# Patient Record
Sex: FEMALE | Race: AMERICAN INDIAN OR ALASKA NATIVE
[De-identification: names, ages, dates, MRNs, and addresses within clinical notes are randomized per-mention and may not be internally consistent; named-entity substitution may affect disease eponyms.]

---

## 2017-05-10 ENCOUNTER — HOSPITAL ENCOUNTER (EMERGENCY)
Dept: HOSPITAL 31 - C.ER | Age: 52
Discharge: HOME | End: 2017-05-10
Payer: COMMERCIAL

## 2017-05-10 VITALS
DIASTOLIC BLOOD PRESSURE: 90 MMHG | OXYGEN SATURATION: 99 % | TEMPERATURE: 97.6 F | HEART RATE: 75 BPM | SYSTOLIC BLOOD PRESSURE: 164 MMHG

## 2017-05-10 VITALS — RESPIRATION RATE: 20 BRPM

## 2017-05-10 DIAGNOSIS — M54.16: Primary | ICD-10-CM

## 2017-05-10 LAB
BILIRUB UR-MCNC: NEGATIVE MG/DL
GLUCOSE UR STRIP-MCNC: NORMAL MG/DL
KETONES UR STRIP-MCNC: NEGATIVE MG/DL
LEUKOCYTE ESTERASE UR-ACNC: (no result) LEU/UL
PH UR STRIP: 5 [PH] (ref 5–8)
PROT UR STRIP-MCNC: (no result) MG/DL
RBC # UR STRIP: NEGATIVE /UL
RBC #/AREA URNS HPF: < 1 /HPF (ref 0–3)
SP GR UR STRIP: 1.02 (ref 1–1.03)
UROBILINOGEN UR-MCNC: NORMAL MG/DL (ref 0.2–1)
WBC #/AREA URNS HPF: 1 /HPF (ref 0–5)

## 2017-05-10 NOTE — C.PDOC
History Of Present Illness


51 yr old female presents to the ER for evaluation of right sided back pain, 

gradually worsening since morning. Patient states the pain is localized and 

worse with movement and standing. Patient admits to the similar symptoms in the 

past. Patient denies trauma, injury, nausea, vomiting, abdominal pain, diarrhea

, dysuria, hematuria, incontinence, saddle anesthesia, weakness or numbness to B

/L LEs. Ambulate to Ed for evaluation, appears in pain. 


Time Seen by Provider: 05/10/17 21:17


Chief Complaint (Nursing): Back Pain


History Per: Patient


History/Exam Limitations: no limitations


Onset/Duration Of Symptoms: Gradual (Since morning )


Current Symptoms Are (Timing): Still Present





Past Medical History


Reviewed: Historical Data, Nursing Documentation, Vital Signs


Vital Signs: 


 Last Vital Signs











Temp  97.6 F   05/10/17 21:16


 


Pulse  75   05/10/17 21:16


 


Resp  18   05/10/17 21:16


 


BP  164/90 H  05/10/17 21:16


 


Pulse Ox  99   05/10/17 21:46














- Medical History


PMH: HTN


Family History: States: No Known Family Hx





- Social History


Hx Alcohol Use: No


Hx Substance Use: No





Review Of Systems


Except As Marked, All Systems Reviewed And Found Negative.


Gastrointestinal: Negative for: Nausea, Vomiting, Abdominal Pain, Diarrhea


Genitourinary: Negative for: Dysuria, Incontinence, Hematuria


Musculoskeletal: Positive for: Back Pain (Right sided back pain )


Neurological: Negative for: Weakness, Numbness





Physical Exam





- Physical Exam


Appears: Well, Non-toxic, No Acute Distress


Skin: Normal Color, Warm


Eye(s): bilateral: PERRL


Nose: Normal


Throat: Normal, No Erythema, No Exudate, No Drooling


Neck: Trachea Midline, Supple


Cardiovascular: Rhythm Regular


Respiratory: No Stridor, No Wheezing


Gastrointestinal/Abdominal: Soft, No Tenderness


Back: No CVA Tenderness, No Vertebral Tenderness, Paraspinal Tenderness (RIGHT 

LUMBAR PARASPINAL TENDERNESS. NO MIDLINE TENDERNESS), Other (RIGHT FLANK 

TENDERNESS)


Extremity: No Pedal Edema, No Swelling


Neurological/Psych: Oriented x3, Normal Speech, Normal Motor, Normal Sensation, 

Normal Reflexes





ED Course And Treatment


O2 Sat by Pulse Oximetry: 99


Pulse Ox Interpretation: Normal


Progress Note: On re-eval, pt is afebrile, hemodynamicalyt stable. Nontoxic. 

AMbulaoty irn ED with stable gait. PulseOx 98% RA.  ENT: no acute findings.  ABd

: benign. Neurologicaly intact. UA results review and appears normal. Pt has 

clinical findings c/w Right sided lumbar radiculopathy. Pt advised. Ref. to f/u 

with PMD in 2-3 days for re-eval.  return if any new changes.





Medical Decision Making


Medical Decision Making: 


PLAN:


* Urinalysis 


* Percocet PO 


* Motrin PO 








Disposition


Counseled Patient/Family Regarding: Studies Performed, Diagnosis, Need For 

Followup, Rx Given





- Disposition


Referrals: 


Germán Cannon MD [Staff Provider] - 


Disposition: HOME/ ROUTINE


Disposition Time: 21:49


Condition: STABLE


Additional Instructions: 


Take medication as prescribed





Light duty to lower back





Follow up with PMD in 2-3 days for re-evaluation.


Return to ED if any worsening or new changes.


Prescriptions: 


Ibuprofen [Motrin Tab] 600 mg PO Q6 #20 tab


Methocarbamol [Robaxin] 500 mg PO TID #14 tab


traMADol [Ultram] 50 mg PO TID #7 tab


Instructions:  Lumbar Radiculopathy (ED)





- Clinical Impression


Clinical Impression: 


 Lumbar radiculopathy








- PA / NP / Resident Statement


MD/DO has reviewed & agrees with the documentation as recorded.





- Scribe Statement


The provider has reviewed the documentation as recorded by the Scribe


Lana Rodriguez





All medical record entries made by the Scribe were at my direction and 

personally dictated by me. I have reviewed the chart and agree that the record 

accurately reflects my personal performance of the history, physical exam, 

medical decision making, and the department course for this patient. I have 

also personally directed, reviewed, and agree with the discharge instructions 

and disposition.

## 2018-03-27 ENCOUNTER — HOSPITAL ENCOUNTER (EMERGENCY)
Dept: HOSPITAL 14 - H.ER | Age: 53
Discharge: HOME | End: 2018-03-27
Payer: COMMERCIAL

## 2018-03-27 VITALS
HEART RATE: 100 BPM | OXYGEN SATURATION: 99 % | DIASTOLIC BLOOD PRESSURE: 84 MMHG | RESPIRATION RATE: 18 BRPM | TEMPERATURE: 98.8 F | SYSTOLIC BLOOD PRESSURE: 150 MMHG

## 2018-03-27 DIAGNOSIS — J02.9: Primary | ICD-10-CM

## 2018-03-27 DIAGNOSIS — I10: ICD-10-CM

## 2018-03-27 NOTE — ED PDOC
HPI: General Adult


Time Seen by Provider: 03/27/18 16:36


Chief Complaint (Nursing): ENT Problem


History Per: Patient


Additional Complaint(s): 





Pt. states she's had sore throat since Sunday morning with mild intermittent 

cough that began today. Denies abdominal pain, N/V/D, hemoptysis, chest pain, 

SOB, rash. 





Past Medical History


Reviewed: Historical Data, Nursing Documentation, Vital Signs


Vital Signs: 


 Last Vital Signs











Temp  98.8 F   03/27/18 16:02


 


Pulse  100 H  03/27/18 16:02


 


Resp  18   03/27/18 16:02


 


BP  150/84   03/27/18 16:02


 


Pulse Ox  99   03/27/18 17:11














- Medical History


PMH: HTN





- Family History


Family History: States: Unknown Family Hx





- Home Medications


Home Medications: 


 Ambulatory Orders











 Medication  Instructions  Recorded


 


Losartan [Cozaar] mg PO DAILY 12/19/16


 


Ofloxacin Otic 0.3% [Floxin 0.3% 10 drop AD DAILY #1 bottle 12/19/16





Otic Soln]  


 


Ibuprofen [Motrin Tab] 600 mg PO Q6 #20 tab 05/10/17


 


Methocarbamol [Robaxin] 500 mg PO TID #14 tab 05/10/17


 


traMADol [Ultram] 50 mg PO TID #7 tab 05/10/17


 


Naproxen [Naprosyn] 500 mg PO BID PRN #10 tab 03/27/18














- Allergies


Allergies/Adverse Reactions: 


 Allergies











Allergy/AdvReac Type Severity Reaction Status Date / Time


 


lisinopril Allergy  ANGIOEDEMA Verified 12/19/16 18:34














Review of Systems


ROS Statement: Except As Marked, All Systems Reviewed And Found Negative


ENT: Positive for: Throat Pain


Respiratory: Positive for: Cough





Physical Exam





- Physical Exam


Appears: Positive for: Well, Non-toxic, No Acute Distress


Skin: Positive for: Normal Color, Warm.  Negative for: Rash


Eye Exam: Positive for: Normal appearance


ENT: Positive for: TM Is/Are (non-erythematous, non-bulging b/l), Pharyngeal 

Erythema.  Negative for: Tonsillar Exudate, Tonsillar Swelling


Cardiovascular/Chest: Positive for: Regular Rate, Rhythm.  Negative for: 

Tachycardia


Respiratory: Positive for: Normal Breath Sounds.  Negative for: Crackles, Rales

, Rhonchi, Stridor, Respiratory Distress


Gastrointestinal/Abdominal: Positive for: Normal Exam, Bowel Sounds, Soft.  

Negative for: Tenderness


Neurologic/Psych: Positive for: Alert, Oriented





- ECG


O2 Sat by Pulse Oximetry: 99





- Progress


ED Course And Treament: 





Rapid strep, naproxen 500mg PO ordered 





1853


Rapid strep: negative.


On re-evaluation, pt. reports feeling much better. 





Disposition





- Clinical Impression


Clinical Impression: 


 Viral pharyngitis








- Patient ED Disposition


Is Patient to be Admitted: No





- Disposition


Referrals: 


[facility] [Outside]


Disposition: Routine/Home


Disposition Time: 18:54


Condition: STABLE


Prescriptions: 


Naproxen [Naprosyn] 500 mg PO BID PRN #10 tab


 PRN Reason: Pain or fever


Instructions:  Viral Pharyngitis


Forms:  [employer] (English)


Print Language: ENGLISH

## 2018-05-03 ENCOUNTER — HOSPITAL ENCOUNTER (EMERGENCY)
Dept: HOSPITAL 14 - H.ER | Age: 53
Discharge: HOME | End: 2018-05-03
Payer: COMMERCIAL

## 2018-05-03 VITALS
HEART RATE: 74 BPM | TEMPERATURE: 98.5 F | SYSTOLIC BLOOD PRESSURE: 154 MMHG | OXYGEN SATURATION: 100 % | RESPIRATION RATE: 17 BRPM | DIASTOLIC BLOOD PRESSURE: 82 MMHG

## 2018-05-03 VITALS — BODY MASS INDEX: 41.5 KG/M2

## 2018-05-03 DIAGNOSIS — M17.12: Primary | ICD-10-CM

## 2018-05-03 DIAGNOSIS — I10: ICD-10-CM

## 2018-05-03 NOTE — RAD
PROCEDURE:  Left Knee Radiographs.



HISTORY:

Pain.



COMPARISON:

None.



FINDINGS:



BONES:

No evidence of acute displaced fracture nor dislocation. The osseous 

structures appear intact.  



JOINTS:

Mild degenerative osteoarthritis most notably affecting the medial 

and patellofemoral compartments with mild medial joint space 

narrowing and slight spurring of the medial tibial spine. There are 

also small posterior patellar osteophytes with small to medium-sized 

anterior superior patella enthesophyte. 



JOINT EFFUSION:

Suspect trace suprapatellar joint effusion 



OTHER FINDINGS:

None.



IMPRESSION:

No acute fractures.  Mild DJD

## 2018-08-03 ENCOUNTER — HOSPITAL ENCOUNTER (OUTPATIENT)
Dept: HOSPITAL 31 - C.SDS | Age: 53
Discharge: HOME | End: 2018-08-03
Attending: STUDENT IN AN ORGANIZED HEALTH CARE EDUCATION/TRAINING PROGRAM
Payer: COMMERCIAL

## 2018-08-03 VITALS
TEMPERATURE: 97 F | HEART RATE: 88 BPM | DIASTOLIC BLOOD PRESSURE: 70 MMHG | RESPIRATION RATE: 18 BRPM | SYSTOLIC BLOOD PRESSURE: 147 MMHG

## 2018-08-03 VITALS — OXYGEN SATURATION: 100 %

## 2018-08-03 VITALS — BODY MASS INDEX: 41.1 KG/M2

## 2018-08-03 DIAGNOSIS — S83.232D: ICD-10-CM

## 2018-08-03 DIAGNOSIS — S83.272D: ICD-10-CM

## 2018-08-03 DIAGNOSIS — M93.262: Primary | ICD-10-CM

## 2018-08-03 DIAGNOSIS — M67.862: ICD-10-CM

## 2018-08-03 LAB
HEPATITIS A IGM: NEGATIVE
HEPATITIS B CORE AB: NEGATIVE
HEPATITIS C ANTIBODY: NEGATIVE

## 2018-08-03 PROCEDURE — 29880 ARTHRS KNE SRG MNISECTMY M&L: CPT

## 2018-08-03 PROCEDURE — 80074 ACUTE HEPATITIS PANEL: CPT

## 2018-08-03 PROCEDURE — 29876 ARTHRS KNEE SURG SYNVCT MAJ: CPT

## 2018-08-03 PROCEDURE — 86703 HIV-1/HIV-2 1 RESULT ANTBDY: CPT

## 2018-08-03 PROCEDURE — 86592 SYPHILIS TEST NON-TREP QUAL: CPT

## 2018-08-03 PROCEDURE — 36415 COLL VENOUS BLD VENIPUNCTURE: CPT

## 2018-08-03 PROCEDURE — 86706 HEP B SURFACE ANTIBODY: CPT

## 2018-08-03 RX ADMIN — HYDROMORPHONE HYDROCHLORIDE PRN MG: 1 INJECTION, SOLUTION INTRAMUSCULAR; INTRAVENOUS; SUBCUTANEOUS at 13:38

## 2018-08-03 RX ADMIN — HYDROMORPHONE HYDROCHLORIDE PRN MG: 1 INJECTION, SOLUTION INTRAMUSCULAR; INTRAVENOUS; SUBCUTANEOUS at 13:18

## 2018-08-03 NOTE — PCM.ANESB7
Adductor Canal Block





- Adductor Canal Block


Date of Procedure: 08/03/18


Anesthiologist: Greg Putnam


Pre-Procedure Diagnosis: acute postoperative pain


Post-Procedure Diagnosis: acute postoperative pain


Procedure Performed: Adductor Canal Block Left





- Procedure


Adductor Canal Block: 


The procedure was explained to the patient that it is for the post-operative 

pain management. Consent was obtained after a thorough discussion with the 

patient regarding the benefits and possible complications of local anesthetic 

adductor canal block of the femoral nerve. Standard monitors, as defined by the 

ASA, were applied to the patient. Time-out was held with the circulating nurse 

to confirm the appropriate block. After applying supplemental oxygen and 

administering IV Sedation as needed, the patient was placed in supine position 

with and the operative leg was flexed slightly at the knee and externally 

rotated as needed, and was kept anatomically stable. The mid-thigh of the  LEFT 

lower extremity was exposed. The ultrasound transducer was then applied 

transversely along the medial aspect, about midway down the thigh and the 

femoral artery and vein were identified in appropriate relation with the 

sartorius muscle. At this time, the femoral nerve was visualized lateral to the 

femoral artery within the canal. After thorough identification, this area area 

was prepped with Chloroprep solution three times and 1 % Lidocaine was injected 

subcutaneously for topical anesthesia.





At this point, a #22 gauge Stimuplex 4-inch needle was inserted in-plane in a 

lateral-to-medial orientation, and advanced toward the femoral nerve. 

Advancement was performed carefully under direct ultrasound visualization. . 

After negative aspiration, _10_cc of __0.25% bupivicaine__was injected and this 

was followed with 0.25% bupivicaine. Under ultrasound guidance the local 

anesthetics were observed spreading around the saphenous nerve. The needle was 

removed intact and sterile dressing was applied. The patient had stable vital 

signs, was conscious and in no apparent distress.





The patient tolerated the  nerve block well with stable vital signs with no 

complaints throughout, no paraesthesias or pain during injection.

## 2018-08-04 NOTE — PCM.SURG1
Surgeon's Initial Post Op Note





- Surgeon's Notes


Surgeon: Mena Albarado MD


Assistant: TRENTON Bowman


Type of Anesthesia: General Endo, Block Regional


Pre-Operative Diagnosis: Left knee:  #1 medial meniscal tear.  #2 osteochondral 

full thickness injury to Lateral femoral condyle.  #3 lateral patellar 

maltracking.  #4 valgus alignment.  #5 synovitis


Operative Findings: Left knee:  #1 medial meniscal tear (complex posterior horn 

flap tear not repairable/ peripheral red-red zone tear with instability).  #2 

osteochondral full thickness injury to Lateral femoral condyle (measuring 

[id number] at anterior aspect of WB zone).  #3 full thickness cartilage injury 

to medial femoral condyle (measuring 20mm AP x 8mm width at WB aspect of middle 

MFC).  #4 lateral meniscal tear (free edge complex tearing, not repairable/ 

peripheral red-red zone menisco-capsular detachment, repairable).  #5 lateral 

patellar maltracking (grade 2).  #6 valgus alignment.  #7 synovitis all 3 

compartments.  #8 hypertorphic fat pad inflammed (casuing anterior impingement)

.  #9 patello-femoral/ trochlear grade 2 chondromalacia


Post-Operative Diagnosis: Left knee:  #1 medial meniscal tear (complex 

posterior horn flap tear not repairable/ peripheral red-red zone tear with 

instability).  #2 osteochondral full thickness injury to Lateral femoral 

condyle (measuring [id number] at anterior aspect of WB zone).  #3 full thickness 

cartilage injury to medial femoral condyle (measuring 20mm AP x 8mm width at WB 

aspect of middle MFC).  #4 lateral meniscal tear (free edge complex tearing, 

not repairable/ peripheral red-red zone menisco-capsular detachment, repairable)

.  #5 lateral patellar maltracking (grade 2).  #6 valgus alignment.  #7 

synovitis all 3 compartments.  #8 hypertorphic fat pad inflammed (casuing 

anterior impingement).  #9 patello-femoral/ trochlear grade 2 chondromalacia


Operation Performed: Left knee :  #1 open osteochondral allograft transplant 

lateral femoral condyle.  #2 open osteochondral allograft transplant medial 

femoral condyle.  #3 arthroscopic lateral meniscus repair.  #4 arthroscopic 

partial medial meniscectomy.  #5 arthroscopic extensive synovectomy all 3 

compartments.  #6 open extensor mechanism realignment (VMO advancement and MPF 

L plication).  #7 arthroscopic resection and debridement of symptomatic med and 

lateral plica bands.  #8 arthroscopic resection and debridement of hypertrophic

  fat pad.  #9 arthroscopic intra-articular PRP injection


Specimen/Specimens Removed: specimen = none.  Complications = none.  Tourniquet 

time = 120 minutes at 300 mmHg.  Implant =.  #1 Linvatec all inside Sequent 

meniscal repair system with 4 implants used for medial meniscus stabilization, 

8 implants used for lateral meniscus repair, 3 kids opened in total.  #2 [employer]

/Arthrex lateral femoral condyle fresh osteochondral allograft for which 

osteochondral allograft transplantation was carried out to the lateral femoral 

condyle and medial femoral condyle


Estimated Blood Loss: EBL {In ML}: 50


Blood Products Given: N/A


Drains Used: No Drains


Post-Op Condition: Good


Date of Surgery/Procedure: 08/03/18


Time of Surgery/Procedure: 12:00

## 2018-08-05 NOTE — OP
Copied To:  Mena Albarado MD

Attending MD:  Mena Albarado MD



PROCEDURE DATE:  2018



PREOPERATIVE DIAGNOSES:  Left knee,

1.  Medial meniscal tear.

2.  Osteochondral injury, lateral femoral condyle.

3.  Lateral patellar maltracking.

4.  Valgus alignment.

5.  Synovitis.



POSTOPERATIVE DIAGNOSES:   Left knee,

1.  Medial meniscal tear (complex posterior horn flap tear not

repairable/peripheral red-red zone tear with instability amenable to

stabilization).

2.  Osteochondral full-thickness injury to lateral femoral condyle

(measuring 20 mm anterior to posterior x 23 mm width at anterior aspect of

weightbearing zone).

3.  Full-thickness cartilage injury to medial femoral condyle (measuring 20

mm anterior to posterior x 8 mm width at weightbearing aspect of middle

zone of medial femoral condyle).

4.  Lateral meniscal tear (free-edge complex tearing not

repairable/peripheral red-red zone meniscal capsular detachment repairable)

5.  Lateral patellar maltracking grade 2.

6.  Valgus alignment.

7.  Synovitis in all 3 compartments.

8.  Hypertrophic inflamed fat-pad (causing anterior impingement).

9.  Patellofemoral/trochlear grade 2 chondromalacia.



PROCEDURE:  Left knee,

1.  Open osteochondral allograft transplantation to lateral femoral

condyle.

2.  Open osteochondral allograft transplantation to medial femoral condyle.

3.  Arthroscopic lateral meniscus repair.

4.  Arthroscopic partial medial meniscectomy.

5.  Arthroscopic extensive synovectomy, all three compartments.

6.  Open extensor mechanism realignment.

7.  Arthroscopic resection and debridement of symptomatic medial and

lateral plica bands.

8.  Arthroscopic resection and debridement of hypertrophic fat-pad.

9.  Arthroscopic intraarticular PRP injection.

10.  Arthroscopic chondroplasty patellofemoral joint.



SURGEON:  Mena Albarado MD



ASSISTANT:  Liz Tavera PA-C



JUSTIFICATION FOR ASSISTANT:  Liz Tavera is a certified physician

assistant whose skilled surgical service was an absolutely necessity for

successful completion of the procedure as he provided skilled surgical

assistance with positioning of patient, positioning of extremity,

management of surgical field, management of arthroscopic equipment

including all inside arthroscopic meniscal repair, chondroplasty,

retractions of neurovascular structures, and preparation of recipient site

for osteochondral allograft to medial femoral condyle and lateral femoral

condyle, preparation of donor osteochondral allograft transplantation

grafts, allograft transplantation to both sites, final placement and

fixation of osteochondral allograft transplantation to medial femoral

condyle and lateral femoral condyle, open extensor mechanism realignment,

wound closure, fitting and placement of postop hinged knee brace.  Emaus

Liang was present for the entire case and was an absolute necessity for

successful completion of the procedure.



TYPE OF ANESTHESIA:  General endotracheal anesthesia with a postop regional

nerve block placed by anesthesia staff in PACU.



COMPLICATIONS:  None.



ESTIMATED BLOOD LOSS:  50 mL



SPECIMEN:  None.



DRAINS:  None.



IMPLANTS:  L[employer]atec All-Inside Meniscal Repair System with 4 implants used

for medial meniscus stabilization, 8 implants used for lateral meniscus

repair, 4 kits opened in total.  Chartboost/Arthrex lateral femoral condyle

fresh osteochondral allograft for which the osteochondral allograft

transplantation was harvested and used as a donor for the recipient to the

lateral femoral condyle and medial femoral condyle.



DISPOSITION:  Patient was extubated and transferred to the PACU in stable

condition and tolerated the procedure well.



INDICATIONS FOR SURGERY:  The patient is a 53-year-old female with a past

medical history significant for hypertension who presents to the office

under my care for the first time on 2018 with left knee pain with

multiple episodes of giving way and locking.  She stated that she thought 3

weeks previously she had a misstep and had progressively worsening medial

and lateral joint line pain.  Prior to this, she did not have significant

left knee pain.  She had been increasing her activity as well to try to

lose weight, and at this point in time, feels that she is frustrated

because she is not able to continue her graf progress in her lifestyle

modification to lose weight.  She was referred by her primary care

physician, Dr. Cannon, who attempted for an MRI that was done at [facility] on 2018 of the left knee.  MRI left knee

done at [facility] on 2018 was read as:

1.  Mild-to-moderate cartilage loss involving the anterior to mid portion

of the lateral femoral condyle with a large focal area of signal

abnormality with cortical surface abnormality at the level of the anterior

lateral femoral condyle near the intercondylar notch, measuring 2 x 1.5 x

2.2 cm.  This may represent a prominent osteochondral lesion with

associated articular surface and cortical abnormality.

2.  Thinning and attenuation with increased signal seen within the

visualized anterior cruciate ligament, suggested for moderate great sprain.

3.  Transverse linear oblique signal within the body and posterior horn of

the medial meniscus, suggested for a tear.

4.  Linear increased signal seen within the body and posterior horn of the

lateral meniscus, suggested for intrasubstance degeneration.

5.  High-grade sprain and/or partial tearing, medial collateral ligament.

6.  Focal cartilage thinning and loss at the medial patellar facet.

7.  Moderate cartilage thinning and loss involving the anterior to mid

portion of the medial compartment.



On initial presentation in the office, she had painful range of motion with

pain localized to the medial and lateral joint lines with no evidence of

instability.  There was reproducible clicking and catching along the

lateral femoral condyle during patellar tracking representing what we

thought would be an osteochondral lesion interacting with the

patellofemoral joint as she had lateral patellar maltracking and a valgus

alignment.  The patella exhibited grade 1 to 2 lateral patellar maltracking

but no full on instability or ability to dislocate.  X-rays done in my

office showed that the joint spaces were well maintained with no

significant advanced signs of degenerative joint disease present.  She

underwent a cortisone mixture injection in my office on 2018 that

resulted in complete resolution of pain and she was placed in a hinged knee

brace.  She was referred to physical therapy for which she was compliant. 

She returned to the office 4 weeks later and stated that the injection

provided her with near complete resolution of pain for approximately 1 week

and then the pain progressively returned to baseline level of 8/10 at all

times.  She was having difficulty with ADLs and performing her work

functions and getting to work to commute.  After reviewing her MRI with her

and going through treatment options, she stated that she did not want to

undergo anymore injections and that she wanted to have definitive care as

soon as possible, as this pain was acute in onset and she did have acute

changes on MRI.  She was indicated for left knee arthroscopic, medial and

possibly lateral meniscus repairs versus partial meniscotomy, synovectomy,

and all related indicated arthroscopic procedures as well as open

osteochondral allograft transplantation to the lateral femoral condyle and

possibly the medial femoral condyle as well as an open extensor mechanism

realignment in the form of MPFL plication and VMO advancement and all

related indicated procedures.  The risks, benefits, and alternatives of the

procedure were discussed at length with the patient with the risks included

not limited to infection, neurovascular damage, need for further surgery,

failure of graft incorporation, graft rejection, accelerated chondral wear,

postoperative pain, development of chronic pain and disability, development

of blood clots including DVT and PE, need for further surgery, progression

of degenerative joint disease to the point of needing a knee replacement,

inability to return to preinjury level of activity and function, anesthesia

reactions including death.  After answering all of her questions, she

stated that she understood the risks and wished to proceed with surgery. 

She watched surgical animation videos and diagnosis animation video and

stated that she had a good understanding of her procedure as well as her

diagnoses.  I reviewed at length with her the postoperative rehabilitation

protocol and she stated that she had full understanding of the protocol and

knew that she had to be compliant with the postop instructions in order for

her to maximize chances of having successful outcome after surgery.  She

was referred to her primary care physician, Dr. Cannon, for preoperative

medical evaluation and the procedure was scheduled at [facility] on

2018.



PROCEDURE IN DETAIL:  The patient was identified in the preoperative

holding area and the left knee was marked for surgery.  Once again, as

described above, the risks, benefits, and alternatives of the procedure

were discussed at length with the patient and informed consent was

obtained.  After brief discussion with anesthesia staff, the patient was

taken to the operating room and placed on a well padded operating room

table without bony prominences and superficial neurovascular structures

were well padded.  An initial time-out was done with the surgeon,

anesthesia staff, OR staff, all in agreement with the patient, procedure to

be done, extremity to be operated on.  General anesthesia was administered

without difficult or complications.  Then, examination under anesthesia was

then carried.



EXAMINATION UNDER ANESTHESIA:  Left knee with full range of motion compared

to contralateral knee.  There was reproducible clicking and catching at the

lateral aspect of the patellofemoral joint, representing an osteochondral

defect of the lateral femoral condyle anteriorly engaging with the patella

as the patella had lateral patellar maltracking that was evident on exam,

no evidence of instability, negative Lachman, negative anterior drawer,

negative posterior drawer, negative reverse Lachman, negative pivot shift,

negative reverse pivot shift, negative opening to medial and lateral joint

line to 0 to 30 degrees varus or valgus stress, patella with grade 2

lateral maltracking and subluxation with no gross dislocation evident with

a positive J-sign and overall positive valgus alignment.



CONTINUATION OF PROCEDURE:  Tourniquet was placed high on the left thigh

but never inflated.  Left lower extremity was prepped and draped in

standard sterile fashion.  Final time-out was done with the surgeon,

anesthesia staff, OR staff, all in agreement with the patient, procedure to

be done, and extremity to be operated on.  We began with a diagnostic

arthroscopy and arthroscopic surgery/treatment of intraarticular pathology.

The knee was insufflated with 50 mL of normal saline.  Anterolateral portal

was created with stab incision through skin down and subcutaneous tissues

down to the level of capsule.  Blunt arthroscopic trocar and cannula were

inserted into the suprapatellar pouch and insufflation of the arthroscopic

fluid was begun.  Arthroscopic camera was inserted and with the use of

spinal needle localization, anteromedial portal was created through stab

incision through skin down to subcutaneous tissues down to the level of

capsule and the accessory cannula was inserted through the anteromedial

portal.  The knee was then copiously irrigated for better visualization and

removal of synovial debris.  With the use of an arthroscopic probe, a

diagnostic arthroscopy was then carried out.



DIAGNOSTIC ARTHROSCOPY:  We first turned our attention to the suprapatellar

pouch and there was no evidence of adhesions or loose bodies.  Attention

was then turned towards the patellofemoral joint.  What immediately seen

was a laterally subluxed patella with a grade 2 lateral instability and

maltracking with chondromalacia at the medial facet grade 2 with no

focal-thickness defect seen, just fibrillation of cartilage.  We then

turned our attention to the medial gutter, where immediately seen extending

from the inferomedial aspects of the patella to the medial retinaculum was

a thickened hypertrophic medial plica band with significant inflammation. 

There was also significant hypertrophic synovium and synovitis throughout

all 3 compartments.  We then turned out attention to the medial

compartment, where immediately seen was a complex flap tear of the

posterior horn that did not appear to be repairable with some degenerative

changes.  The posterior horn also exhibited a secondary tear in the

periphery with significant instability of the posterior horn with ability

to sublux the posterior horn of the meniscus beyond the midpoint of the

medial femoral condyle.  The medial femoral condyle exhibited a focal area

of 8 mm width x 20 mm anterior to posterior length of full-thickness

cartilage loss at the weightbearing aspect of the medial femoral condyle

with surrounding intact zone of cartilage.  The medial tibial plateau

exhibited one small area measuring 2 mm x 2 mm of full-thickness cartilage

injury and intact cartilage throughout the rest of the plateau.  This

isolated medial tibial plateau zone of injury was just adjacent to the

anterior horn of the medial meniscus at the most anteromedial aspect of the

plateau.  Attention was then turned towards the intercondylar notch where

intact ACL and PCL were seen, and again, there was hypertrophic synovium

throughout all 3 compartments as well as a thickened hypertrophic and

inflamed fat-pad that appeared to be causing anterior impingement

coinciding with the patient's pain at terminal extension.  Attention was

then turned towards the lateral compartment where immediately seen and

visualized at the initial diagnostic scope at the patellofemoral joint was

a zone of osteochondral injury and chondral blistering with underlying

exposed subchondral bone at the anterior aspect of the lateral femoral

condyle at the weightbearing zone at the junction with the trochlea, also

interacting with the patella as it was a lateral maltracking patella.  This

zone of injury measured 20 mm in width x 23 mm in anterior to posterior

length.  Also, within the lateral compartment, under careful evaluation was

a lateral meniscus tear with 2 zones of injury.  There was a free edge

complex tearing at the posterior horn extending to the mid body that was

not amenable to repair.  There was also a red-red zone peripheral injury of

the lateral meniscus with significant instability.



ARTHROSCOPIC LATERAL MENISCUS REPAIR:  With the use of arthroscopic shaver

and radiofrequency ablation, partial lateral meniscectomy was carried out,

resecting less than 5% of lateral meniscus overall, debriding and

establishing a smooth contour at the area of free edge tear as described

above.  This was a complex tear at the free edge of the posterior horn,

extending into the posterior midbody as complex free edge white-white zone

injury.  There was also the secondary zone of injury of the lateral

meniscus at the periphery as a red-red zone injury/meniscal capsular

separation with resulting lateral meniscus instability as a large

peripheral tear.  The quality of the lateral meniscus tissue was good, and

therefore, we decided to proceed with lateral meniscus repair and

stabilization.  With the use of the [employer] All-Inside Meniscal Repair

System, 4 implants were placed superiorly at the superior aspect of lateral

meniscus anterior to the popliteal hiatus with care taken not to

incorporate the popliteal tendon into the repair constructs.  Four implants

were placed with good capsular-sided fixation with resulting 3 vertical

mattress sutures.  This provided significant stability and restoration of

the meniscal capsular relationship of the posterior horn of the lateral

meniscus.  This was repeated with placement of 4 more implants on the

inferior aspect of the posterior horn posterior to the popliteal hiatus

restoring good stability, and meniscal capsular separation was repaired

successfully with good stability achieved.  Again, this was a peripheral

red-red zone injury and the tissue was of good quality and amenable to

repair and stabilization.



ARTHROSCOPIC CHONDROPLASTY:  With the use of arthroscopic shaver and

radiofrequency ablation, chondroplasty of the patellofemoral joint and the

medial tibial plateau chondral injury was carried out.  Smooth contouring

to the patella and trochlea were established with use of the radiofrequency

ablation and the shaver.  The small zone of focal cartilage injury to the

anteromedial aspect of the tibial plateau was debrided with use of

arthroscopic shaver and radiofrequency ablation establishing a smooth

contour as well.  Once the chondroplasty was completed at the

patellofemoral joint and lateral compartment, we then turned our attention

to medial compartment.



ARTHROSCOPIC PARTIAL MEDIAL MENISCECTOMY:  We turned our attention to the

medial compartment and reevaluated the medial meniscus.  As stated before,

it was complex tearing with poor quality of meniscal tissue remaining.  It

was a large flap tear extending from the posterior midbody to the posterior

horn that was not amenable to repair.  There was also secondary complex

tearing at the posterior horn posterior root junction.  There was also a

peripheral posterior horn red-red zone injury with instability resulting of

the posterior horn.  With the use of radiofrequency ablation and the

arthroscopic shaver and meniscal biters, a partial medial meniscotomy was

carried out resecting the flap tear and the posterior horn/posterior root

complex tearing that was not amenable to repair.  This resulted in

resection of approximately 25% of the medial meniscus overall.  The remnant

posterior horn medial meniscus was amenable to stabilization and with the

use of the [employer] All-Inside Meniscal Repair System, 4 implants were

placed at the posterior horn with alternating horizontal and vertical

mattress sutures resulting in good stability with inability to sublux the

posterior horn beyond the medial femoral condyle as was evident prior to

the stabilization.  Once we are satisfied with the medial compartment

treatment, we then continued with the extensive synovectomy.



ARTHROSCOPIC EXTENSIVE SYNOVECTOMY:  With the use of radiofrequency

ablation and arthroscopic shaver, an extensive synovectomy in all 3

compartments was carried out that was beyond but was considered usual and

customary for better visualization during arthroscopic procedures and a

significant amount of surgical time was dedicated to this part of the

procedure.  The synovium exhibited significant hypertrophic and inflamed

synovitis throughout all 3 compartments as well as a hypertrophic and

inflamed thickened medial and lateral plica bands as well as hypertrophic

and inflamed anterior infrapatellar fat-pad causing anterior impingement on

extension.  With the use of  radiofrequency ablation and arthroscopic

shaver, an extensive synovectomy was carried out including resection of the

hypertrophic synovium throughout all 3 compartments, resection and

debridement of the symptomatic medial and lateral plica bands while

maintaining good hemostasis, resection and debridement of the hypertrophic

fat-pad causing anterior impingement.  Once this was all carried out to

satisfaction while maintaining good hemostasis, we then turned out

attention to the open part of the procedure.



To facilitate the open osteochondral allograft transplantation to the

lateral femoral condyle lesion and the medial femoral condyle lesion as

well as the extensor mechanism realignment, a medial parapatellar approach

was employed for this procedure.  The anterior medial portal was expanded

proximally along the medial edge of the patella through skin down

subcutaneous tissues down to the level of the retinaculum maintaining good

hemostasis.  Tourniquet was inflated for this part of the procedure.  The

left lower extremity was exsanguinated and the tourniquet was inflated for

a total tourniquet time of 120 minutes at 300 mmHg.  Incision was carried

out and extended through skin down subcutaneous tissues down to the level

of retinaculum while maintaining good hemostasis.  Incision was

approximately 5 cm in length starting at the level of the anterior medial

portal and working proximally.  Once we reached the level of retinaculum,

care was taken to perform arthrotomy that would also help and facilitate

the extensor mechanism realignment with plication of the MPFL and

advancement of the VMO insertion.  Incision was made through the medial

fibers of the quadriceps tendon down along the vastus medius obliquus that

was elevated from superior pole of the patella around the medial aspect of

the patella down to the medial aspect of the patellar tendon fibers.  This

allowed for full access to the knee joint chondral surface as well as the

ability to retract the patella for access to the lateral femoral condyle

chondral lesion.  We first turned our attention to the lateral femoral

condyle osteochondral defect, which was measured with the sizer and a 22.5

mm osteochondral plug was found to cover the defect perfectly. 

Corresponding area of damage at the lateral femoral condyle of the donor

was sized and marked and a 22.5 mm width 15 mm depth osteochondral plug was

harvested successfully from the donor.  A 22.5 mm reamer was then used to

prepare the recipient site.  This was carried out successfully and the

depth measurements were taken at the 12 o'clock, 3 o'clock, 6 o'clock, and

9 o'clock positions on the recipient site.  These corresponding depth

measurements were then matched up with the donor and the osteochondral

allograft plug was prepared accordingly to fit within the recipient site

flush.  Once the final preparations were made, the plug was then impacted

carefully into the recipient site flush and good position with good

stability achieved and the chondral surface with the surrounding host

lateral femoral condyle cartilage was flushed.



OPEN OSTEOCHONDRAL ALLOGRAFT TRANSPLANTATION TO THE MEDIAL FEMORAL CONDYLE:

The medial femoral condyle osteochondral injury was full-thickness and

measured 8 mm in width and 20 mm from anterior to posterior length.  The

surrounding cartilage was intact, and therefore, we decided to proceed with

a focal treatment to this oval full-thickness cartilage injury of the

medial femoral condyle with a biologic unicondylar osteochondral allograft

transplantation to the medial femoral condyle.  The donor was prepared

accordingly and a size small osteochondral unicondylar biologic allograft

in the shape of an oval size small from [employer] was harvested.  The sizer

was used to ensure that the lesion on the medial femoral condyle would be

entirely covered with this transplant.  Once the donor site was harvested

and the graft was obtained, we then turned out attention to the recipient

site preparation.  The recipient site was prepared and depth measurements

were taken for the oval.  We confirmed the depth measurements with the

donor graft.  Final adjustments were made for depth placement and flush

fitting of the graft.  The graft was then impacted carefully into position

and fit perfectly flushed with the surrounding chondral surface of the

medial femoral condyle with no step-off.  Once this was done with

satisfaction, the knee was copiously irrigated and both osteochondral

allograft recipient and donor were copiously irrigated prior to the

completion of the transplantation as well.  Once this was done to

satisfaction, the final images were taken as clinical pictures of the

transplant.  We then turned out attention to the wound closure and extensor

mechanism realignment.



OPEN EXTENSOR MECHANISM REALIGNMENT:  With use of #2 FiberWire suture with

a pants-over-vest configuration, the vastus medialis obliquus muscle

insertion at the superior medial aspect of the patella was advanced and

secured as a proximal extensor mechanism realignment.  The quadriceps

tendon was repaired as closure for the arthrotomy and also plicated over as

an advancement to help with the extensor mechanism realignment.  The MPFL

was identified and as it was repaired back to the location on the patella

it was plicated to offer a tightening of the medial retinaculum and MPFL,

which was successful overall as clinically the patella sat within the

trochlear groove and tracked nicely with no remaining subluxation evident. 

Once the retinacular closure was completed, skin was reapproximated with

2-0 Vicryl suture followed by 3-0 Monocryl for skin.



Arthroscopic camera was then inserted back into the knee joint and the

extensor synovectomy was completed to satisfaction with maintaining good

hemostasis.  Final arthroscopic pictures were taken of the lateral meniscus

repair and partial medial meniscectomy as well as the 2 osteochondral

allograft transplants and arthroscopic pictures were taken showing the

patella well fitted within the trochlea with resolution and successful

extensor mechanism realignment carried out.  Once final images were taken,

the arthroscopic fluid was evacuated from the knee as well as all

arthroscopic debris.



ARTHROSCOPIC INTRAARTICULAR PRP INJECTION:  With the help of anesthesia

staff through a peripheral venous stick, we were able to obtain 7 mL of

PRP.  A venous stick was carried out by anesthesia staff and the blood was

spun in the centrifuge from [employer].  This resulted in 7 mL of PRP which

was injected intraarticularly under direct visualization successfully in

its entirety.  Arthroscopic portal was then closed with 2-0 Vicryl suture

for subcutaneous tissues followed by 3-0 Monocryl suture for skin.  Sterile

dressings were applied followed by a layer of sterile cast padding from the

toes up to the superior thigh followed by a layer of compressive Ace wrap

from the toes up to the superior thigh.  The knee was then fitted in placed

in a postoperative hinged knee brace locked in extension provided by my

office.  Once the brace on and locked in extension, the patient was

extubated and transferred to PACU in stable condition and tolerated the

procedure well.



JUSTIFICATION FOR BILLING AND CODIN.  An arthroscopic All-Inside lateral meniscus repair was carried out

successfully and therefore it was coded and billed.

2.  Arthroscopic partial and medial meniscectomy was carried out

successfully as well as stabilization.  The majority of the medial meniscus

treatment was a partial medial meniscectomy and the stabilization was done

to preserve the posterior horn of the medial meniscus and to prevent from

further injury as the mainstay of treatment was the partial medial

meniscectomy resecting 25% to 30% of the medial meniscus overall. 

Therefore, a partial medial meniscectomy was coded and billed.

3.  Arthroscopic extensive synovectomy was carried out that was beyond but

was considered usual and customary for arthroscopic surgery for better

visualization and this included extensive synovectomy of all 3 compartments

of the knee joint, debridement and resection of hypertrophic fat-pad

causing anterior impingement, resection and debridement of hypertrophic

thickened plica bands that were symptomatic.  Extensive synovectomy in all

3 compartments was carried as described, and therefore, was coded and

billed.

4.  Arthroscopic chondroplasty of the medial tibial plateau and the patella

and the trochlea were carried out successfully, and therefore,

chondroplasty was coded and billed.

5.  Open osteochondral allograft transplantation to the lateral femoral

condyle zone of full-thickness cartilage injury/osteochondral injury was

carried out successfully, and therefore, it was coded and billed.

6.  Open osteochondral allograft transplantation of medial femoral condyle

as a separate osteochondral defect in a separate compartment was carried

out successfully, and therefore, it was coded and billed as an independent

part of the procedure.

7.  Open extensor mechanism realignment was carried out successfully

treating the lateral patellar maltracking with evident patella well fitted

within the trochlea with good stability achieved, and therefore, was coded

and billed.

8.  Arthroscopic intraarticular PRP injection was carried out successfully.

and therefore. was coded and billed.

9.  A postoperative hinged knee brace provided my office was fitted and

placed on the patient at the end of the procedure and was of medical

necessity to protect the lateral meniscus repair as well as the

osteochondral allograft transplant and provide stability for the knee for

the patient.  The brace was provided by my office, and therefore, was coded

and billed.



DISPOSITION:  Patient will be discharged home when she is covered from

anesthesia in PACU.  She has been given a prescription for Percocet for

pain control.  She has been given a prescription for Lovenox as DVT

prophylaxis 40 mg subcutaneous injection for 2 weeks starting postoperative

day #1.  She is instructed to keep the dressings clean, dry, and intact. 

She will be weightbearing as tolerated with the brace locked in extension

at 0 degrees.  She is instructed to unlock the brace and work on gentle

progressive range of motion and she has been given a prescription to start

physical therapy.  She will follow up in my office within one week and

already has a postoperative appointment setup.  She will contact me

directly if there is any questions or concerns.



__________________________________________

Mena Albarado MD





DD:  2018 19:23:30

DT:  2018 23:43:13

Job # [id number]

## 2018-11-29 ENCOUNTER — HOSPITAL ENCOUNTER (EMERGENCY)
Dept: HOSPITAL 14 - H.ER | Age: 53
Discharge: HOME | End: 2018-11-29
Payer: COMMERCIAL

## 2018-11-29 VITALS
HEART RATE: 82 BPM | SYSTOLIC BLOOD PRESSURE: 132 MMHG | DIASTOLIC BLOOD PRESSURE: 74 MMHG | TEMPERATURE: 98 F | RESPIRATION RATE: 18 BRPM

## 2018-11-29 VITALS — OXYGEN SATURATION: 98 %

## 2018-11-29 VITALS — BODY MASS INDEX: 41.2 KG/M2

## 2018-11-29 DIAGNOSIS — R07.89: Primary | ICD-10-CM

## 2018-11-29 DIAGNOSIS — M25.562: ICD-10-CM

## 2018-11-29 LAB
ALBUMIN SERPL-MCNC: 4 G/DL (ref 3.5–5)
ALBUMIN/GLOB SERPL: 0.9 {RATIO} (ref 1–2.1)
ALT SERPL-CCNC: 25 U/L (ref 9–52)
AST SERPL-CCNC: 26 U/L (ref 14–36)
BASOPHILS # BLD AUTO: 0.1 K/UL (ref 0–0.2)
BASOPHILS NFR BLD: 1.4 % (ref 0–2)
BUN SERPL-MCNC: 16 MG/DL (ref 7–17)
CALCIUM SERPL-MCNC: 9.7 MG/DL (ref 8.4–10.2)
EOSINOPHIL # BLD AUTO: 0.1 K/UL (ref 0–0.7)
EOSINOPHIL NFR BLD: 0.9 % (ref 0–4)
ERYTHROCYTE [DISTWIDTH] IN BLOOD BY AUTOMATED COUNT: 14.4 % (ref 11.5–14.5)
GFR NON-AFRICAN AMERICAN: 58
HGB BLD-MCNC: 11.6 G/DL (ref 12–16)
LYMPHOCYTES # BLD AUTO: 2.4 K/UL (ref 1–4.3)
LYMPHOCYTES NFR BLD AUTO: 39.5 % (ref 20–40)
MCH RBC QN AUTO: 26.9 PG (ref 27–31)
MCHC RBC AUTO-ENTMCNC: 32 G/DL (ref 33–37)
MCV RBC AUTO: 84 FL (ref 81–99)
MONOCYTES # BLD: 0.4 K/UL (ref 0–0.8)
MONOCYTES NFR BLD: 7.5 % (ref 0–10)
NEUTROPHILS # BLD: 3 K/UL (ref 1.8–7)
NEUTROPHILS NFR BLD AUTO: 50.7 % (ref 50–75)
NRBC BLD AUTO-RTO: 0.1 % (ref 0–0)
PLATELET # BLD: 223 K/UL (ref 130–400)
PMV BLD AUTO: 9.3 FL (ref 7.2–11.7)
RBC # BLD AUTO: 4.31 MIL/UL (ref 3.8–5.2)
WBC # BLD AUTO: 6 K/UL (ref 4.8–10.8)

## 2018-11-29 PROCEDURE — 83880 ASSAY OF NATRIURETIC PEPTIDE: CPT

## 2018-11-29 PROCEDURE — 83735 ASSAY OF MAGNESIUM: CPT

## 2018-11-29 PROCEDURE — 81025 URINE PREGNANCY TEST: CPT

## 2018-11-29 PROCEDURE — 93005 ELECTROCARDIOGRAM TRACING: CPT

## 2018-11-29 PROCEDURE — 93971 EXTREMITY STUDY: CPT

## 2018-11-29 PROCEDURE — 84484 ASSAY OF TROPONIN QUANT: CPT

## 2018-11-29 PROCEDURE — 85025 COMPLETE CBC W/AUTO DIFF WBC: CPT

## 2018-11-29 PROCEDURE — 71046 X-RAY EXAM CHEST 2 VIEWS: CPT

## 2018-11-29 PROCEDURE — 71275 CT ANGIOGRAPHY CHEST: CPT

## 2018-11-29 PROCEDURE — 85378 FIBRIN DEGRADE SEMIQUANT: CPT

## 2018-11-29 PROCEDURE — 99284 EMERGENCY DEPT VISIT MOD MDM: CPT

## 2018-11-29 PROCEDURE — 80053 COMPREHEN METABOLIC PANEL: CPT

## 2018-11-29 NOTE — US
Date of service: 



11/29/2018



HISTORY:

 LLE pain s/p Lt knee arthroscopy.  



PRIORS:

None. 



FINDINGS:

2-D, color and duplex Doppler analysis of the lower extremity venous 

circulation using routine protocol from the femoral veins through the 

popliteal veins.



Venous compressibility: Normal. 



Flow and augmentation patterns: Normal. 



Visualized veins upper third of calf: Normal. 



Baker cyst: None. 



IMPRESSION:

No sonographic or Doppler evidence for DVT in left lower extremity.

## 2018-11-29 NOTE — ED PDOC
Lower Extremity Pain/Injury


Additional Complaint(s): 


Pt seen and examined at bedside with attending.





53F PMH HTN and s/p LEFT knee arthroscopy p/w worsening LLE pain medially. There

is associated proximal calf discomfort and she has contacted Dr Sherman and 

has a follow up appt Monday, 12/03/2018.  However the lower extremity pain 

became too much and she came in. She denies any SOB, fevers, chills, but is 

reporting sharp, stabbing like chest pain b/l yesterday only and has since 

resolved.





<Danielle Dickey - Last Filed: 11/29/18 17:20>





<Mihai Aquino III - Last Filed: 11/30/18 12:41>


Time Seen by Provider: 11/29/18 11:11





Supervising Attending Note





- Attestation:


I have personally seen and examined this patient.: Yes


I have fully participated in the care of the patient.: Yes


I have reviewed all pertinent clinical information, including history, physical 

exam and plan: Yes





- Notes:


Notes:: 





pt seen and knee examined, agree with findings and plan, has appt w Dr Barrientos 

monday. Rx short course analgesics. 





<Mihai Aquino III - Last Filed: 11/30/18 12:41>





Past Medical History


Vital Signs: 





                                Last Vital Signs











Temp  36.8 C   11/29/18 10:49


 


Pulse  74   11/29/18 10:49


 


Resp  16   11/29/18 10:49


 


BP  145/84   11/29/18 10:49


 


Pulse Ox  98   11/29/18 10:49














- Medical History


PMH: HTN





- Surgical History


Surgical History: Tonsillectomy





- Family History


Family History: States: Unknown Family Hx





<Danielle Dickey - Last Filed: 11/29/18 17:20>


Vital Signs: 





                                Last Vital Signs











Temp  98.0 F   11/29/18 14:06


 


Pulse  82   11/29/18 14:06


 


Resp  18   11/29/18 14:06


 


BP  132/74   11/29/18 14:06


 


Pulse Ox  98   11/29/18 17:20














<Mihai Aquino III - Last Filed: 11/30/18 12:41>





- Home Medications


Home Medications: 


                                Ambulatory Orders











 Medication  Instructions  Recorded


 


Losartan/Hydrochlorothiazide 1 tab PO DAILY 07/27/18





[Losartan-Hctz 100-25 mg Tab]  


 


RX: Fexofenadine HCl 180 mg PO DAILY 07/27/18


 


RX: traMADol [Ultram] 50 mg PO Q6H PRN #5 tab 11/29/18














- Allergies


Allergies/Adverse Reactions: 


                                    Allergies











Allergy/AdvReac Type Severity Reaction Status Date / Time


 


lisinopril Allergy Severe ANAPHYLAXIS Verified 11/29/18 11:12














Wells Criteria for PE





- Wells Criteria for Pulmonary Embolism


Clinical Signs and Symptoms of DVT: Yes


P.E is #1 Diagnosis, or Equally Likely: Yes


Heart Rate >100: No


Immobilization at least 3 days;Surgery previous 4 weeks: No


Previous, objectively diagnosed PE or DVT: No


Hemoptysis: No


Malignancy w/treatment within 6 months, or palliative: No


Total Score: 4





<Danielle Dickey - Last Filed: 11/29/18 17:20>





Review of Systems


ROS Statement: Except As Marked, All Systems Reviewed And Found Negative


Constitutional: Negative for: Fever, Chills


Cardiovascular: Positive for: Chest Pain (sharp, transient x1 day, not 

experiencing currently)


Respiratory: Negative for: Cough, Shortness of Breath


Musculoskeletal: Positive for: Leg Pain (LLE medial proximal tibia/distal femur 

and knee pain)





<Danielle Dickey - Last Filed: 11/29/18 17:20>





Physical Exam





- Reviewed


Vital Signs Reviewed: Yes





- Physical Exam


Appears: Positive for: Well, Non-toxic, No Acute Distress


Head Exam: Positive for: ATRAUMATIC


Skin: Positive for: Normal Color, Warm, Dry


Eye Exam: Positive for: Normal appearance


ENT: Positive for: Normal ENT Inspection


Neck: Positive for: Supple


Cardiovascular/Chest: Positive for: Regular Rate, Rhythm.  Negative for: Murmur


Respiratory: Positive for: Normal Breath Sounds.  Negative for: Crackles, Rales,

 Wheezing


Gastrointestinal/Abdominal: Positive for: Normal Exam, Bowel Sounds, Soft.  

Negative for: Tenderness


Extremity: Positive for: Tenderness (at LEFT proximal lower leg, includes medial

 knee and distal medial femur).  Negative for: Pedal Edema, Calf Tenderness, 

Swelling


Neurologic/Psych: Positive for: Alert, Oriented





<Danielle Dickey - Last Filed: 11/29/18 17:20>





- Laboratory Results


Result Diagrams: 


                                 11/29/18 12:55





                                 11/29/18 12:55





- ECG


O2 Sat by Pulse Oximetry: 98





<ThompsonDanielle ayala - Last Filed: 11/29/18 17:20>





- Laboratory Results


Result Diagrams: 


                                 11/29/18 12:55





                                 11/29/18 12:55





<Mihai Aquino III - Last Filed: 11/30/18 12:41>





Medical Decision Making


Medical Decision Making: 


Initial history was limited to LLE complaints, but later found to also be having

 chest discomfort.  So, will r/o DVT/PNA/PE/cardiac.


- EKG, CXR, LLE venous duplex


- CBC, CMP, D-dimer, Mg, Troponin


- U preg








Upreg is negative, CXR no active disease (radiologist)


CBC mild anemia, no leukocytosis


CMP WNL, Troponin is negative, Mg wnl


D-dimer: 2689





1345


Duplex of LLE negative for DVT (prelim from [place])





1505


BNP is 36.7





1615


CTA chest is being read as no PE.





1700


Spoke with Dr Sherman and he is agreeable to discharge patient with Tramadol 

and will see her Monday 12/03/18.





<ThompsonlucyDanielle - Last Filed: 11/29/18 17:20>





Disposition





- Patient ED Disposition


Is Patient to be Admitted: No


Counseled Patient/Family Regarding: Diagnosis, Rx Given





- Disposition


Disposition: Routine/Home


Disposition Time: 16:21





<Danielle Dickey - Last Filed: 11/29/18 17:20>





<Mihai Aquino III - Last Filed: 11/30/18 12:41>





- Clinical Impression


Clinical Impression: 


 Chest pain, Left knee pain








- Disposition


Referrals: 


Stanislaw Barrientos MD [Staff Provider] - 12/03/18


Condition: IMPROVED


Additional Instructions: 


Follow up with Dr Sherman


Take medication as prescribed


Return for worsening symptoms, Shortness of breath, chest pain


Prescriptions: 


RX: traMADol [Ultram] 50 mg PO Q6H PRN #5 tab


 PRN Reason: Pain, Moderate (4-7)


Instructions:  Chest Pain, Knee Pain (DC)


Forms:  [facility] (English), [facility] ED School/Work Excuse

## 2018-11-29 NOTE — CARD
--------------- APPROVED REPORT --------------





Date of service: 11/29/2018



EKG Measurement

Heart [id number]

MN [id number]

[id number]

[id number]

[id number]



<Conclusion>

Normal sinus rhythm

Minimal voltage criteria for LVH, may be normal variant

Nonspecific ST and T wave abnormality

Abnormal ECG

## 2018-11-29 NOTE — RAD
Date of service: 



11/29/2018



HISTORY:

 chest pain 



COMPARISON:

No prior.



TECHNIQUE:

Chest PA and lateral



FINDINGS:



LUNGS:

No active pulmonary disease.



PLEURA:

No significant pleural effusion identified. No pneumothorax apparent.



CARDIOVASCULAR:

No aortic atherosclerotic calcification present.



Normal cardiac size. No pulmonary vascular congestion. 



OSSEOUS STRUCTURES:

No significant abnormalities.



VISUALIZED UPPER ABDOMEN:

Normal.



OTHER FINDINGS:

None.



IMPRESSION:

No active disease.

## 2018-11-29 NOTE — CT
Date of service: 



11/29/2018



PROCEDURE:  CT Chest with contrast (Pulmonary Angiogram)



HISTORY:

chest pain, elevated d-dimer, LLE pain



COMPARISON:

None available.



TECHNIQUE:

Axial computed tomography images were obtained of the chest in the 

pulmonary arterial phase of enhancement. Coronal and sagittal 

reformatted images were created and reviewed.



Intravenous contrast dose: 100 mL of Visipaque 320 intravenously.



Radiation dose:



Total exam DLP = 373.07 mGy-cm.



This CT exam was performed using one or more of the following dose 

reduction techniques: Automated exposure control, adjustment of the 

mA and/or kV according to patient size, and/or use of iterative 

reconstruction technique.



FINDINGS:



PULMONARY ARTERIES:

Suboptimal opacification of the peripheral subsegmental pulmonary 

arteries.  No evidence of central pulmonary embolus.  The main 

pulmonary artery is normal in caliber. 



AORTA:

No acute findings. No thoracic aortic aneurysm.  The ascending 

thoracic aorta is slightly ectatic measures up to 3.7 centimeter in 

the transverse diameter.  No aortic atherosclerotic calcification or 

mural plaque present.



LUNGS:

Possible mild pulmonary vascular congestion.  No nodule, mass or 

pulmonary consolidation. 



PLEURAL SPACES:

Unremarkable. No effusion or pneumothorax. 



HEART:

The heart is mildly enlarged.  No evidence of pericardial effusion. 



LYMPH NODES:

No lymphadenopathy.



BONES, CHEST WALL:

Unremarkable. No fracture or destructive lesion 



OTHER FINDINGS:

Unremarkable. 



IMPRESSION:

No evidence of central pulmonary embolus.  The assessment of small 

peripheral subsegmental pulmonary arteries is suboptimal in this 

study.



Mild cardiomegaly and possible mild pulmonary vascular congestion.